# Patient Record
Sex: FEMALE | Race: WHITE | NOT HISPANIC OR LATINO
[De-identification: names, ages, dates, MRNs, and addresses within clinical notes are randomized per-mention and may not be internally consistent; named-entity substitution may affect disease eponyms.]

---

## 2017-01-31 ENCOUNTER — ASOB RESULT (OUTPATIENT)
Age: 27
End: 2017-01-31

## 2017-01-31 ENCOUNTER — APPOINTMENT (OUTPATIENT)
Dept: ANTEPARTUM | Facility: CLINIC | Age: 27
End: 2017-01-31

## 2017-02-01 ENCOUNTER — APPOINTMENT (OUTPATIENT)
Dept: ANTEPARTUM | Facility: CLINIC | Age: 27
End: 2017-02-01

## 2017-02-15 ENCOUNTER — INPATIENT (INPATIENT)
Facility: HOSPITAL | Age: 27
LOS: 1 days | Discharge: HOME | End: 2017-02-17
Attending: OBSTETRICS & GYNECOLOGY | Admitting: OBSTETRICS & GYNECOLOGY
Payer: COMMERCIAL

## 2017-02-15 VITALS — HEIGHT: 64 IN | WEIGHT: 167.55 LBS

## 2017-02-15 DIAGNOSIS — O47.1 FALSE LABOR AT OR AFTER 37 COMPLETED WEEKS OF GESTATION: ICD-10-CM

## 2017-02-15 DIAGNOSIS — Z34.80 ENCOUNTER FOR SUPERVISION OF OTHER NORMAL PREGNANCY, UNSPECIFIED TRIMESTER: ICD-10-CM

## 2017-02-15 LAB
BASOPHILS # BLD AUTO: 0 K/UL — SIGNIFICANT CHANGE UP (ref 0–0.2)
BASOPHILS NFR BLD AUTO: 0.3 % — SIGNIFICANT CHANGE UP (ref 0–2)
BLD GP AB SCN SERPL QL: NEGATIVE — SIGNIFICANT CHANGE UP
EOSINOPHIL # BLD AUTO: 0.1 K/UL — SIGNIFICANT CHANGE UP (ref 0–0.5)
EOSINOPHIL NFR BLD AUTO: 0.8 % — SIGNIFICANT CHANGE UP (ref 0–6)
HCT VFR BLD CALC: 35.4 % — SIGNIFICANT CHANGE UP (ref 34.5–45)
HGB BLD-MCNC: 12.2 G/DL — SIGNIFICANT CHANGE UP (ref 11.5–15.5)
LYMPHOCYTES # BLD AUTO: 1.8 K/UL — SIGNIFICANT CHANGE UP (ref 1–3.3)
LYMPHOCYTES # BLD AUTO: 13.5 % — SIGNIFICANT CHANGE UP (ref 13–44)
MCHC RBC-ENTMCNC: 26.4 PG — LOW (ref 27–34)
MCHC RBC-ENTMCNC: 34.4 GM/DL — SIGNIFICANT CHANGE UP (ref 32–36)
MCV RBC AUTO: 76.7 FL — LOW (ref 80–100)
MONOCYTES # BLD AUTO: 0.6 K/UL — SIGNIFICANT CHANGE UP (ref 0–0.9)
MONOCYTES NFR BLD AUTO: 4.2 % — SIGNIFICANT CHANGE UP (ref 2–14)
NEUTROPHILS # BLD AUTO: 11 K/UL — HIGH (ref 1.8–7.4)
NEUTROPHILS NFR BLD AUTO: 81.2 % — HIGH (ref 43–77)
PLATELET # BLD AUTO: 159 K/UL — SIGNIFICANT CHANGE UP (ref 150–400)
RBC # BLD: 4.61 M/UL — SIGNIFICANT CHANGE UP (ref 3.8–5.2)
RBC # FLD: 14 % — SIGNIFICANT CHANGE UP (ref 10.3–14.5)
RH IG SCN BLD-IMP: POSITIVE — SIGNIFICANT CHANGE UP
T PALLIDUM AB TITR SER: NEGATIVE — SIGNIFICANT CHANGE UP
WBC # BLD: 13.5 K/UL — HIGH (ref 3.8–10.5)
WBC # FLD AUTO: 13.5 K/UL — HIGH (ref 3.8–10.5)

## 2017-02-15 RX ORDER — MAGNESIUM HYDROXIDE 400 MG/1
30 TABLET, CHEWABLE ORAL
Qty: 0 | Refills: 0 | Status: DISCONTINUED | OUTPATIENT
Start: 2017-02-15 | End: 2017-02-17

## 2017-02-15 RX ORDER — PRAMOXINE HYDROCHLORIDE 150 MG/15G
1 AEROSOL, FOAM RECTAL EVERY 4 HOURS
Qty: 0 | Refills: 0 | Status: DISCONTINUED | OUTPATIENT
Start: 2017-02-15 | End: 2017-02-17

## 2017-02-15 RX ORDER — PENICILLIN G POTASSIUM 5000000 [IU]/1
POWDER, FOR SOLUTION INTRAMUSCULAR; INTRAPLEURAL; INTRATHECAL; INTRAVENOUS
Qty: 0 | Refills: 0 | Status: DISCONTINUED | OUTPATIENT
Start: 2017-02-15 | End: 2017-02-15

## 2017-02-15 RX ORDER — OXYTOCIN 10 UNIT/ML
41.67 VIAL (ML) INJECTION
Qty: 20 | Refills: 0 | Status: DISCONTINUED | OUTPATIENT
Start: 2017-02-15 | End: 2017-02-15

## 2017-02-15 RX ORDER — HYDROCORTISONE 1 %
1 OINTMENT (GRAM) TOPICAL EVERY 4 HOURS
Qty: 0 | Refills: 0 | Status: DISCONTINUED | OUTPATIENT
Start: 2017-02-15 | End: 2017-02-17

## 2017-02-15 RX ORDER — OXYTOCIN 10 UNIT/ML
333.33 VIAL (ML) INJECTION
Qty: 20 | Refills: 0 | Status: DISCONTINUED | OUTPATIENT
Start: 2017-02-15 | End: 2017-02-15

## 2017-02-15 RX ORDER — OXYCODONE HYDROCHLORIDE 5 MG/1
5 TABLET ORAL EVERY 4 HOURS
Qty: 0 | Refills: 0 | Status: DISCONTINUED | OUTPATIENT
Start: 2017-02-15 | End: 2017-02-17

## 2017-02-15 RX ORDER — SIMETHICONE 80 MG/1
80 TABLET, CHEWABLE ORAL EVERY 6 HOURS
Qty: 0 | Refills: 0 | Status: DISCONTINUED | OUTPATIENT
Start: 2017-02-15 | End: 2017-02-17

## 2017-02-15 RX ORDER — SODIUM CHLORIDE 9 MG/ML
1000 INJECTION, SOLUTION INTRAVENOUS
Qty: 0 | Refills: 0 | Status: DISCONTINUED | OUTPATIENT
Start: 2017-02-15 | End: 2017-02-15

## 2017-02-15 RX ORDER — PENICILLIN G POTASSIUM 5000000 [IU]/1
2.5 POWDER, FOR SOLUTION INTRAMUSCULAR; INTRAPLEURAL; INTRATHECAL; INTRAVENOUS EVERY 4 HOURS
Qty: 0 | Refills: 0 | Status: DISCONTINUED | OUTPATIENT
Start: 2017-02-15 | End: 2017-02-15

## 2017-02-15 RX ORDER — KETOROLAC TROMETHAMINE 30 MG/ML
30 SYRINGE (ML) INJECTION ONCE
Qty: 0 | Refills: 0 | Status: DISCONTINUED | OUTPATIENT
Start: 2017-02-15 | End: 2017-02-17

## 2017-02-15 RX ORDER — OXYTOCIN 10 UNIT/ML
333.33 VIAL (ML) INJECTION
Qty: 20 | Refills: 0 | Status: COMPLETED | OUTPATIENT
Start: 2017-02-15

## 2017-02-15 RX ORDER — KETOROLAC TROMETHAMINE 30 MG/ML
30 SYRINGE (ML) INJECTION ONCE
Qty: 0 | Refills: 0 | Status: DISCONTINUED | OUTPATIENT
Start: 2017-02-15 | End: 2017-02-15

## 2017-02-15 RX ORDER — IBUPROFEN 200 MG
600 TABLET ORAL EVERY 6 HOURS
Qty: 0 | Refills: 0 | Status: DISCONTINUED | OUTPATIENT
Start: 2017-02-15 | End: 2017-02-17

## 2017-02-15 RX ORDER — LANOLIN
1 OINTMENT (GRAM) TOPICAL EVERY 6 HOURS
Qty: 0 | Refills: 0 | Status: DISCONTINUED | OUTPATIENT
Start: 2017-02-15 | End: 2017-02-17

## 2017-02-15 RX ORDER — SODIUM CHLORIDE 9 MG/ML
3 INJECTION INTRAMUSCULAR; INTRAVENOUS; SUBCUTANEOUS EVERY 8 HOURS
Qty: 0 | Refills: 0 | Status: DISCONTINUED | OUTPATIENT
Start: 2017-02-15 | End: 2017-02-17

## 2017-02-15 RX ORDER — DIBUCAINE 1 %
1 OINTMENT (GRAM) RECTAL EVERY 4 HOURS
Qty: 0 | Refills: 0 | Status: DISCONTINUED | OUTPATIENT
Start: 2017-02-15 | End: 2017-02-17

## 2017-02-15 RX ORDER — PENICILLIN G POTASSIUM 5000000 [IU]/1
5 POWDER, FOR SOLUTION INTRAMUSCULAR; INTRAPLEURAL; INTRATHECAL; INTRAVENOUS ONCE
Qty: 0 | Refills: 0 | Status: COMPLETED | OUTPATIENT
Start: 2017-02-15 | End: 2017-02-15

## 2017-02-15 RX ORDER — HYDROCORTISONE 1 %
1 OINTMENT (GRAM) TOPICAL EVERY 4 HOURS
Qty: 0 | Refills: 0 | Status: DISCONTINUED | OUTPATIENT
Start: 2017-02-15 | End: 2017-02-15

## 2017-02-15 RX ORDER — AER TRAVELER 0.5 G/1
1 SOLUTION RECTAL; TOPICAL EVERY 4 HOURS
Qty: 0 | Refills: 0 | Status: DISCONTINUED | OUTPATIENT
Start: 2017-02-15 | End: 2017-02-17

## 2017-02-15 RX ORDER — PRAMOXINE HYDROCHLORIDE 150 MG/15G
1 AEROSOL, FOAM RECTAL EVERY 4 HOURS
Qty: 0 | Refills: 0 | Status: DISCONTINUED | OUTPATIENT
Start: 2017-02-15 | End: 2017-02-15

## 2017-02-15 RX ORDER — CITRIC ACID/SODIUM CITRATE 300-500 MG
15 SOLUTION, ORAL ORAL EVERY 4 HOURS
Qty: 0 | Refills: 0 | Status: DISCONTINUED | OUTPATIENT
Start: 2017-02-15 | End: 2017-02-15

## 2017-02-15 RX ORDER — TETANUS TOXOID, REDUCED DIPHTHERIA TOXOID AND ACELLULAR PERTUSSIS VACCINE, ADSORBED 5; 2.5; 8; 8; 2.5 [IU]/.5ML; [IU]/.5ML; UG/.5ML; UG/.5ML; UG/.5ML
0.5 SUSPENSION INTRAMUSCULAR ONCE
Qty: 0 | Refills: 0 | Status: DISCONTINUED | OUTPATIENT
Start: 2017-02-15 | End: 2017-02-17

## 2017-02-15 RX ORDER — DOCUSATE SODIUM 100 MG
100 CAPSULE ORAL
Qty: 0 | Refills: 0 | Status: DISCONTINUED | OUTPATIENT
Start: 2017-02-15 | End: 2017-02-16

## 2017-02-15 RX ORDER — AER TRAVELER 0.5 G/1
1 SOLUTION RECTAL; TOPICAL EVERY 4 HOURS
Qty: 0 | Refills: 0 | Status: DISCONTINUED | OUTPATIENT
Start: 2017-02-15 | End: 2017-02-15

## 2017-02-15 RX ORDER — SODIUM CHLORIDE 9 MG/ML
1000 INJECTION, SOLUTION INTRAVENOUS ONCE
Qty: 0 | Refills: 0 | Status: DISCONTINUED | OUTPATIENT
Start: 2017-02-15 | End: 2017-02-15

## 2017-02-15 RX ORDER — OXYTOCIN 10 UNIT/ML
41.67 VIAL (ML) INJECTION
Qty: 20 | Refills: 0 | Status: DISCONTINUED | OUTPATIENT
Start: 2017-02-15 | End: 2017-02-17

## 2017-02-15 RX ORDER — OXYCODONE HYDROCHLORIDE 5 MG/1
5 TABLET ORAL
Qty: 0 | Refills: 0 | Status: DISCONTINUED | OUTPATIENT
Start: 2017-02-15 | End: 2017-02-17

## 2017-02-15 RX ORDER — DIPHENHYDRAMINE HCL 50 MG
25 CAPSULE ORAL EVERY 6 HOURS
Qty: 0 | Refills: 0 | Status: DISCONTINUED | OUTPATIENT
Start: 2017-02-15 | End: 2017-02-17

## 2017-02-15 RX ORDER — DIBUCAINE 1 %
1 OINTMENT (GRAM) RECTAL EVERY 4 HOURS
Qty: 0 | Refills: 0 | Status: DISCONTINUED | OUTPATIENT
Start: 2017-02-15 | End: 2017-02-15

## 2017-02-15 RX ORDER — SODIUM CHLORIDE 9 MG/ML
3 INJECTION INTRAMUSCULAR; INTRAVENOUS; SUBCUTANEOUS EVERY 8 HOURS
Qty: 0 | Refills: 0 | Status: DISCONTINUED | OUTPATIENT
Start: 2017-02-15 | End: 2017-02-15

## 2017-02-15 RX ORDER — ACETAMINOPHEN 500 MG
975 TABLET ORAL EVERY 6 HOURS
Qty: 0 | Refills: 0 | Status: DISCONTINUED | OUTPATIENT
Start: 2017-02-15 | End: 2017-02-17

## 2017-02-15 RX ORDER — GLYCERIN ADULT
1 SUPPOSITORY, RECTAL RECTAL AT BEDTIME
Qty: 0 | Refills: 0 | Status: DISCONTINUED | OUTPATIENT
Start: 2017-02-15 | End: 2017-02-17

## 2017-02-15 RX ADMIN — Medication 975 MILLIGRAM(S): at 21:20

## 2017-02-15 RX ADMIN — OXYCODONE HYDROCHLORIDE 5 MILLIGRAM(S): 5 TABLET ORAL at 18:30

## 2017-02-15 RX ADMIN — Medication 600 MILLIGRAM(S): at 21:20

## 2017-02-15 RX ADMIN — Medication 975 MILLIGRAM(S): at 15:15

## 2017-02-15 RX ADMIN — Medication 1000 MILLIUNIT(S)/MIN: at 09:17

## 2017-02-15 RX ADMIN — Medication 975 MILLIGRAM(S): at 20:47

## 2017-02-15 RX ADMIN — Medication 600 MILLIGRAM(S): at 15:15

## 2017-02-15 RX ADMIN — Medication 1 TABLET(S): at 12:45

## 2017-02-15 RX ADMIN — SODIUM CHLORIDE 3 MILLILITER(S): 9 INJECTION INTRAMUSCULAR; INTRAVENOUS; SUBCUTANEOUS at 16:01

## 2017-02-15 RX ADMIN — Medication 30 MILLIGRAM(S): at 09:13

## 2017-02-15 RX ADMIN — Medication 600 MILLIGRAM(S): at 14:24

## 2017-02-15 RX ADMIN — Medication 975 MILLIGRAM(S): at 14:25

## 2017-02-15 RX ADMIN — PENICILLIN G POTASSIUM 200 MILLION UNIT(S): 5000000 POWDER, FOR SOLUTION INTRAMUSCULAR; INTRAPLEURAL; INTRATHECAL; INTRAVENOUS at 05:54

## 2017-02-15 RX ADMIN — OXYCODONE HYDROCHLORIDE 5 MILLIGRAM(S): 5 TABLET ORAL at 17:21

## 2017-02-15 RX ADMIN — Medication 600 MILLIGRAM(S): at 20:47

## 2017-02-16 ENCOUNTER — APPOINTMENT (OUTPATIENT)
Dept: ANTEPARTUM | Facility: CLINIC | Age: 27
End: 2017-02-16

## 2017-02-16 ENCOUNTER — TRANSCRIPTION ENCOUNTER (OUTPATIENT)
Age: 27
End: 2017-02-16

## 2017-02-16 LAB
HCT VFR BLD CALC: 28.1 % — LOW (ref 34.5–45)
HGB BLD-MCNC: 9.6 G/DL — LOW (ref 11.5–15.5)

## 2017-02-16 RX ORDER — DOCUSATE SODIUM 100 MG
100 CAPSULE ORAL
Qty: 0 | Refills: 0 | Status: DISCONTINUED | OUTPATIENT
Start: 2017-02-16 | End: 2017-02-17

## 2017-02-16 RX ORDER — FERROUS SULFATE 325(65) MG
325 TABLET ORAL
Qty: 0 | Refills: 0 | Status: DISCONTINUED | OUTPATIENT
Start: 2017-02-16 | End: 2017-02-17

## 2017-02-16 RX ORDER — ASCORBIC ACID 60 MG
250 TABLET,CHEWABLE ORAL
Qty: 0 | Refills: 0 | Status: DISCONTINUED | OUTPATIENT
Start: 2017-02-16 | End: 2017-02-17

## 2017-02-16 RX ADMIN — Medication 975 MILLIGRAM(S): at 23:16

## 2017-02-16 RX ADMIN — Medication 600 MILLIGRAM(S): at 16:20

## 2017-02-16 RX ADMIN — Medication 600 MILLIGRAM(S): at 20:25

## 2017-02-16 RX ADMIN — Medication 600 MILLIGRAM(S): at 15:45

## 2017-02-16 RX ADMIN — Medication 250 MILLIGRAM(S): at 08:56

## 2017-02-16 RX ADMIN — Medication 975 MILLIGRAM(S): at 23:51

## 2017-02-16 RX ADMIN — Medication 975 MILLIGRAM(S): at 18:44

## 2017-02-16 RX ADMIN — Medication 600 MILLIGRAM(S): at 03:10

## 2017-02-16 RX ADMIN — Medication 325 MILLIGRAM(S): at 08:56

## 2017-02-16 RX ADMIN — Medication 975 MILLIGRAM(S): at 04:00

## 2017-02-16 RX ADMIN — Medication 600 MILLIGRAM(S): at 21:07

## 2017-02-16 RX ADMIN — Medication 100 MILLIGRAM(S): at 08:57

## 2017-02-16 RX ADMIN — Medication 600 MILLIGRAM(S): at 08:57

## 2017-02-16 RX ADMIN — Medication 600 MILLIGRAM(S): at 09:35

## 2017-02-16 RX ADMIN — Medication 975 MILLIGRAM(S): at 03:07

## 2017-02-16 RX ADMIN — Medication 975 MILLIGRAM(S): at 08:57

## 2017-02-16 RX ADMIN — Medication 975 MILLIGRAM(S): at 09:35

## 2017-02-16 RX ADMIN — SODIUM CHLORIDE 3 MILLILITER(S): 9 INJECTION INTRAMUSCULAR; INTRAVENOUS; SUBCUTANEOUS at 00:21

## 2017-02-16 RX ADMIN — Medication 975 MILLIGRAM(S): at 18:00

## 2017-02-16 RX ADMIN — Medication 1 TABLET(S): at 11:48

## 2017-02-16 RX ADMIN — Medication 600 MILLIGRAM(S): at 04:00

## 2017-02-16 NOTE — DISCHARGE NOTE OB - MEDICATION SUMMARY - MEDICATIONS TO STOP TAKING
I will STOP taking the medications listed below when I get home from the hospital:    oxyCODONE 5 mg oral tablet  -- 1 tab(s) by mouth every 4 hours, As needed, Severe Pain

## 2017-02-16 NOTE — DISCHARGE NOTE OB - PATIENT PORTAL LINK FT
“You can access the FollowHealth Patient Portal, offered by [facility], by registering with the following website: [email or web address]”

## 2017-02-16 NOTE — DISCHARGE NOTE OB - MATERIALS PROVIDED
[facility] Hearing Screen Program/[facility] [place] Screening Program/Shaken Baby Prevention Handout/Back To Sleep Handout/Guide to Postpartum Care/Birth Certificate Instructions/Breastfeeding Guide and Packet/Vaccinations/Breastfeeding Log/[place]  Immunization Record

## 2017-02-16 NOTE — DISCHARGE NOTE OB - CARE PROVIDER_API CALL
Nicole Jimenez), Obstetrics and Gynecology  [address]  [place] [address]  Phone: [phone]  Fax: [phone]

## 2017-02-17 VITALS
SYSTOLIC BLOOD PRESSURE: 135 MMHG | TEMPERATURE: 99 F | DIASTOLIC BLOOD PRESSURE: 78 MMHG | OXYGEN SATURATION: 98 % | RESPIRATION RATE: 18 BRPM | HEART RATE: 88 BPM

## 2017-02-17 PROCEDURE — 86901 BLOOD TYPING SEROLOGIC RH(D): CPT

## 2017-02-17 PROCEDURE — 86780 TREPONEMA PALLIDUM: CPT

## 2017-02-17 PROCEDURE — G0463: CPT

## 2017-02-17 PROCEDURE — 85027 COMPLETE CBC AUTOMATED: CPT

## 2017-02-17 PROCEDURE — 86900 BLOOD TYPING SEROLOGIC ABO: CPT

## 2017-02-17 PROCEDURE — 59050 FETAL MONITOR W/REPORT: CPT

## 2017-02-17 PROCEDURE — 59025 FETAL NON-STRESS TEST: CPT

## 2017-02-17 PROCEDURE — 85018 HEMOGLOBIN: CPT

## 2017-02-17 PROCEDURE — 86850 RBC ANTIBODY SCREEN: CPT

## 2017-02-17 RX ADMIN — Medication 600 MILLIGRAM(S): at 13:20

## 2017-02-17 RX ADMIN — Medication 600 MILLIGRAM(S): at 05:08

## 2017-02-17 RX ADMIN — Medication 325 MILLIGRAM(S): at 09:53

## 2017-02-17 RX ADMIN — Medication 1 TABLET(S): at 12:50

## 2017-02-17 RX ADMIN — Medication 100 MILLIGRAM(S): at 05:09

## 2017-02-17 RX ADMIN — Medication 600 MILLIGRAM(S): at 12:50

## 2017-02-17 RX ADMIN — Medication 250 MILLIGRAM(S): at 09:53

## 2017-09-25 ENCOUNTER — RESULT REVIEW (OUTPATIENT)
Age: 27
End: 2017-09-25

## 2019-01-10 ENCOUNTER — RESULT REVIEW (OUTPATIENT)
Age: 29
End: 2019-01-10

## 2020-01-29 ENCOUNTER — ASOB RESULT (OUTPATIENT)
Age: 30
End: 2020-01-29

## 2020-01-29 ENCOUNTER — APPOINTMENT (OUTPATIENT)
Dept: ANTEPARTUM | Facility: CLINIC | Age: 30
End: 2020-01-29
Payer: COMMERCIAL

## 2020-01-29 PROCEDURE — 76816 OB US FOLLOW-UP PER FETUS: CPT

## 2020-02-16 ENCOUNTER — INPATIENT (INPATIENT)
Facility: HOSPITAL | Age: 30
LOS: 1 days | Discharge: HOME | End: 2020-02-18
Attending: OBSTETRICS & GYNECOLOGY | Admitting: OBSTETRICS & GYNECOLOGY
Payer: COMMERCIAL

## 2020-02-16 VITALS — HEIGHT: 64 IN | WEIGHT: 176.37 LBS

## 2020-02-16 DIAGNOSIS — Z34.80 ENCOUNTER FOR SUPERVISION OF OTHER NORMAL PREGNANCY, UNSPECIFIED TRIMESTER: ICD-10-CM

## 2020-02-16 DIAGNOSIS — Z3A.00 WEEKS OF GESTATION OF PREGNANCY NOT SPECIFIED: ICD-10-CM

## 2020-02-16 DIAGNOSIS — O26.899 OTHER SPECIFIED PREGNANCY RELATED CONDITIONS, UNSPECIFIED TRIMESTER: ICD-10-CM

## 2020-02-16 LAB
BASOPHILS # BLD AUTO: 0.07 K/UL — SIGNIFICANT CHANGE UP (ref 0–0.2)
BASOPHILS NFR BLD AUTO: 0.6 % — SIGNIFICANT CHANGE UP (ref 0–2)
BLD GP AB SCN SERPL QL: NEGATIVE — SIGNIFICANT CHANGE UP
EOSINOPHIL # BLD AUTO: 0.31 K/UL — SIGNIFICANT CHANGE UP (ref 0–0.5)
EOSINOPHIL NFR BLD AUTO: 2.9 % — SIGNIFICANT CHANGE UP (ref 0–6)
HCT VFR BLD CALC: 37.1 % — SIGNIFICANT CHANGE UP (ref 34.5–45)
HGB BLD-MCNC: 12.8 G/DL — SIGNIFICANT CHANGE UP (ref 11.5–15.5)
IMM GRANULOCYTES NFR BLD AUTO: 1 % — SIGNIFICANT CHANGE UP (ref 0–1.5)
LYMPHOCYTES # BLD AUTO: 29 % — SIGNIFICANT CHANGE UP (ref 13–44)
LYMPHOCYTES # BLD AUTO: 3.12 K/UL — SIGNIFICANT CHANGE UP (ref 1–3.3)
MCHC RBC-ENTMCNC: 27.2 PG — SIGNIFICANT CHANGE UP (ref 27–34)
MCHC RBC-ENTMCNC: 34.5 GM/DL — SIGNIFICANT CHANGE UP (ref 32–36)
MCV RBC AUTO: 78.8 FL — LOW (ref 80–100)
MONOCYTES # BLD AUTO: 0.89 K/UL — SIGNIFICANT CHANGE UP (ref 0–0.9)
MONOCYTES NFR BLD AUTO: 8.3 % — SIGNIFICANT CHANGE UP (ref 2–14)
NEUTROPHILS # BLD AUTO: 6.27 K/UL — SIGNIFICANT CHANGE UP (ref 1.8–7.4)
NEUTROPHILS NFR BLD AUTO: 58.2 % — SIGNIFICANT CHANGE UP (ref 43–77)
NRBC # BLD: 0 /100 WBCS — SIGNIFICANT CHANGE UP (ref 0–0)
PLATELET # BLD AUTO: 192 K/UL — SIGNIFICANT CHANGE UP (ref 150–400)
RBC # BLD: 4.71 M/UL — SIGNIFICANT CHANGE UP (ref 3.8–5.2)
RBC # FLD: 13.5 % — SIGNIFICANT CHANGE UP (ref 10.3–14.5)
RH IG SCN BLD-IMP: POSITIVE — SIGNIFICANT CHANGE UP
WBC # BLD: 10.77 K/UL — HIGH (ref 3.8–10.5)
WBC # FLD AUTO: 10.77 K/UL — HIGH (ref 3.8–10.5)

## 2020-02-16 RX ORDER — MAGNESIUM HYDROXIDE 400 MG/1
30 TABLET, CHEWABLE ORAL
Refills: 0 | Status: DISCONTINUED | OUTPATIENT
Start: 2020-02-16 | End: 2020-02-18

## 2020-02-16 RX ORDER — BENZOCAINE 10 %
1 GEL (GRAM) MUCOUS MEMBRANE EVERY 6 HOURS
Refills: 0 | Status: DISCONTINUED | OUTPATIENT
Start: 2020-02-16 | End: 2020-02-18

## 2020-02-16 RX ORDER — ACETAMINOPHEN 500 MG
975 TABLET ORAL
Refills: 0 | Status: DISCONTINUED | OUTPATIENT
Start: 2020-02-16 | End: 2020-02-18

## 2020-02-16 RX ORDER — SODIUM CHLORIDE 9 MG/ML
3 INJECTION INTRAMUSCULAR; INTRAVENOUS; SUBCUTANEOUS EVERY 8 HOURS
Refills: 0 | Status: DISCONTINUED | OUTPATIENT
Start: 2020-02-16 | End: 2020-02-18

## 2020-02-16 RX ORDER — OXYCODONE HYDROCHLORIDE 5 MG/1
5 TABLET ORAL
Refills: 0 | Status: DISCONTINUED | OUTPATIENT
Start: 2020-02-16 | End: 2020-02-18

## 2020-02-16 RX ORDER — GLYCERIN ADULT
1 SUPPOSITORY, RECTAL RECTAL AT BEDTIME
Refills: 0 | Status: DISCONTINUED | OUTPATIENT
Start: 2020-02-16 | End: 2020-02-18

## 2020-02-16 RX ORDER — IBUPROFEN 200 MG
600 TABLET ORAL EVERY 6 HOURS
Refills: 0 | Status: DISCONTINUED | OUTPATIENT
Start: 2020-02-16 | End: 2020-02-18

## 2020-02-16 RX ORDER — AMPICILLIN TRIHYDRATE 250 MG
2 CAPSULE ORAL ONCE
Refills: 0 | Status: COMPLETED | OUTPATIENT
Start: 2020-02-16 | End: 2020-02-16

## 2020-02-16 RX ORDER — OXYTOCIN 10 UNIT/ML
333.33 VIAL (ML) INJECTION
Qty: 20 | Refills: 0 | Status: DISCONTINUED | OUTPATIENT
Start: 2020-02-16 | End: 2020-02-18

## 2020-02-16 RX ORDER — FAMOTIDINE 10 MG/ML
20 INJECTION INTRAVENOUS ONCE
Refills: 0 | Status: COMPLETED | OUTPATIENT
Start: 2020-02-16 | End: 2020-02-16

## 2020-02-16 RX ORDER — SODIUM CHLORIDE 9 MG/ML
1000 INJECTION, SOLUTION INTRAVENOUS
Refills: 0 | Status: DISCONTINUED | OUTPATIENT
Start: 2020-02-16 | End: 2020-02-16

## 2020-02-16 RX ORDER — DIBUCAINE 1 %
1 OINTMENT (GRAM) RECTAL EVERY 6 HOURS
Refills: 0 | Status: DISCONTINUED | OUTPATIENT
Start: 2020-02-16 | End: 2020-02-18

## 2020-02-16 RX ORDER — HYDROCORTISONE 1 %
1 OINTMENT (GRAM) TOPICAL EVERY 6 HOURS
Refills: 0 | Status: DISCONTINUED | OUTPATIENT
Start: 2020-02-16 | End: 2020-02-18

## 2020-02-16 RX ORDER — LANOLIN
1 OINTMENT (GRAM) TOPICAL EVERY 6 HOURS
Refills: 0 | Status: DISCONTINUED | OUTPATIENT
Start: 2020-02-16 | End: 2020-02-18

## 2020-02-16 RX ORDER — OXYTOCIN 10 UNIT/ML
333.33 VIAL (ML) INJECTION
Qty: 20 | Refills: 0 | Status: DISCONTINUED | OUTPATIENT
Start: 2020-02-16 | End: 2020-02-16

## 2020-02-16 RX ORDER — SIMETHICONE 80 MG/1
80 TABLET, CHEWABLE ORAL EVERY 4 HOURS
Refills: 0 | Status: DISCONTINUED | OUTPATIENT
Start: 2020-02-16 | End: 2020-02-18

## 2020-02-16 RX ORDER — KETOROLAC TROMETHAMINE 30 MG/ML
30 SYRINGE (ML) INJECTION ONCE
Refills: 0 | Status: DISCONTINUED | OUTPATIENT
Start: 2020-02-16 | End: 2020-02-16

## 2020-02-16 RX ORDER — CITRIC ACID/SODIUM CITRATE 300-500 MG
15 SOLUTION, ORAL ORAL EVERY 6 HOURS
Refills: 0 | Status: DISCONTINUED | OUTPATIENT
Start: 2020-02-16 | End: 2020-02-16

## 2020-02-16 RX ORDER — DIPHENHYDRAMINE HCL 50 MG
25 CAPSULE ORAL EVERY 6 HOURS
Refills: 0 | Status: DISCONTINUED | OUTPATIENT
Start: 2020-02-16 | End: 2020-02-18

## 2020-02-16 RX ORDER — IBUPROFEN 200 MG
600 TABLET ORAL EVERY 6 HOURS
Refills: 0 | Status: COMPLETED | OUTPATIENT
Start: 2020-02-16 | End: 2021-01-14

## 2020-02-16 RX ORDER — PRAMOXINE HYDROCHLORIDE 150 MG/15G
1 AEROSOL, FOAM RECTAL EVERY 4 HOURS
Refills: 0 | Status: DISCONTINUED | OUTPATIENT
Start: 2020-02-16 | End: 2020-02-18

## 2020-02-16 RX ORDER — AER TRAVELER 0.5 G/1
1 SOLUTION RECTAL; TOPICAL EVERY 4 HOURS
Refills: 0 | Status: DISCONTINUED | OUTPATIENT
Start: 2020-02-16 | End: 2020-02-18

## 2020-02-16 RX ORDER — TETANUS TOXOID, REDUCED DIPHTHERIA TOXOID AND ACELLULAR PERTUSSIS VACCINE, ADSORBED 5; 2.5; 8; 8; 2.5 [IU]/.5ML; [IU]/.5ML; UG/.5ML; UG/.5ML; UG/.5ML
0.5 SUSPENSION INTRAMUSCULAR ONCE
Refills: 0 | Status: DISCONTINUED | OUTPATIENT
Start: 2020-02-16 | End: 2020-02-18

## 2020-02-16 RX ORDER — OXYCODONE HYDROCHLORIDE 5 MG/1
5 TABLET ORAL ONCE
Refills: 0 | Status: DISCONTINUED | OUTPATIENT
Start: 2020-02-16 | End: 2020-02-18

## 2020-02-16 RX ORDER — AMPICILLIN TRIHYDRATE 250 MG
1 CAPSULE ORAL EVERY 4 HOURS
Refills: 0 | Status: DISCONTINUED | OUTPATIENT
Start: 2020-02-16 | End: 2020-02-16

## 2020-02-16 RX ADMIN — FAMOTIDINE 20 MILLIGRAM(S): 10 INJECTION INTRAVENOUS at 05:14

## 2020-02-16 RX ADMIN — SODIUM CHLORIDE 125 MILLILITER(S): 9 INJECTION, SOLUTION INTRAVENOUS at 04:43

## 2020-02-16 RX ADMIN — Medication 975 MILLIGRAM(S): at 08:05

## 2020-02-16 RX ADMIN — Medication 975 MILLIGRAM(S): at 21:35

## 2020-02-16 RX ADMIN — Medication 975 MILLIGRAM(S): at 15:57

## 2020-02-16 RX ADMIN — Medication 600 MILLIGRAM(S): at 12:31

## 2020-02-16 RX ADMIN — Medication 30 MILLIGRAM(S): at 06:01

## 2020-02-16 RX ADMIN — Medication 216 GRAM(S): at 03:35

## 2020-02-16 RX ADMIN — Medication 975 MILLIGRAM(S): at 21:02

## 2020-02-16 RX ADMIN — Medication 1 TABLET(S): at 12:32

## 2020-02-16 RX ADMIN — Medication 600 MILLIGRAM(S): at 13:01

## 2020-02-16 RX ADMIN — Medication 1000 MILLIUNIT(S)/MIN: at 06:09

## 2020-02-16 RX ADMIN — Medication 600 MILLIGRAM(S): at 19:00

## 2020-02-16 RX ADMIN — Medication 975 MILLIGRAM(S): at 15:27

## 2020-02-16 RX ADMIN — Medication 600 MILLIGRAM(S): at 18:30

## 2020-02-16 RX ADMIN — Medication 15 MILLILITER(S): at 04:43

## 2020-02-17 LAB
HCT VFR BLD CALC: 35.6 % — SIGNIFICANT CHANGE UP (ref 34.5–45)
HGB BLD-MCNC: 11.6 G/DL — SIGNIFICANT CHANGE UP (ref 11.5–15.5)
T PALLIDUM AB TITR SER: NEGATIVE — SIGNIFICANT CHANGE UP

## 2020-02-17 RX ADMIN — Medication 975 MILLIGRAM(S): at 22:05

## 2020-02-17 RX ADMIN — Medication 975 MILLIGRAM(S): at 17:48

## 2020-02-17 RX ADMIN — Medication 600 MILLIGRAM(S): at 17:48

## 2020-02-17 RX ADMIN — Medication 600 MILLIGRAM(S): at 00:34

## 2020-02-17 RX ADMIN — Medication 600 MILLIGRAM(S): at 00:01

## 2020-02-17 RX ADMIN — Medication 1 TABLET(S): at 12:04

## 2020-02-17 RX ADMIN — Medication 600 MILLIGRAM(S): at 23:10

## 2020-02-17 RX ADMIN — Medication 975 MILLIGRAM(S): at 15:40

## 2020-02-17 RX ADMIN — Medication 975 MILLIGRAM(S): at 21:04

## 2020-02-17 NOTE — PROGRESS NOTE ADULT - ASSESSMENT
Per Dr. Jimenez-  Abdomen: Soft, nontender, non-distended, fundus firm.  Vaginal: Lochia WNL.   Ext: Neg edema, Neg calf tenderness

## 2020-02-18 ENCOUNTER — APPOINTMENT (OUTPATIENT)
Dept: ANTEPARTUM | Facility: CLINIC | Age: 30
End: 2020-02-18

## 2020-02-18 ENCOUNTER — TRANSCRIPTION ENCOUNTER (OUTPATIENT)
Age: 30
End: 2020-02-18

## 2020-02-18 VITALS
DIASTOLIC BLOOD PRESSURE: 77 MMHG | RESPIRATION RATE: 18 BRPM | OXYGEN SATURATION: 97 % | TEMPERATURE: 98 F | HEART RATE: 83 BPM | SYSTOLIC BLOOD PRESSURE: 123 MMHG

## 2020-02-18 PROCEDURE — 85014 HEMATOCRIT: CPT

## 2020-02-18 PROCEDURE — 59025 FETAL NON-STRESS TEST: CPT

## 2020-02-18 PROCEDURE — 86901 BLOOD TYPING SEROLOGIC RH(D): CPT

## 2020-02-18 PROCEDURE — 86780 TREPONEMA PALLIDUM: CPT

## 2020-02-18 PROCEDURE — 85027 COMPLETE CBC AUTOMATED: CPT

## 2020-02-18 PROCEDURE — 59050 FETAL MONITOR W/REPORT: CPT

## 2020-02-18 PROCEDURE — 85018 HEMOGLOBIN: CPT

## 2020-02-18 PROCEDURE — 86850 RBC ANTIBODY SCREEN: CPT

## 2020-02-18 PROCEDURE — 86900 BLOOD TYPING SEROLOGIC ABO: CPT

## 2020-02-18 PROCEDURE — G0463: CPT

## 2020-02-18 RX ORDER — ACETAMINOPHEN 500 MG
3 TABLET ORAL
Qty: 0 | Refills: 0 | DISCHARGE
Start: 2020-02-18

## 2020-02-18 RX ORDER — IBUPROFEN 200 MG
1 TABLET ORAL
Qty: 0 | Refills: 0 | DISCHARGE
Start: 2020-02-18

## 2020-02-18 RX ADMIN — Medication 600 MILLIGRAM(S): at 09:00

## 2020-02-18 RX ADMIN — Medication 600 MILLIGRAM(S): at 08:08

## 2020-02-18 RX ADMIN — Medication 600 MILLIGRAM(S): at 00:10

## 2020-02-18 NOTE — PROGRESS NOTE ADULT - SUBJECTIVE AND OBJECTIVE BOX
Postpartum Note- PPD#2      S:Patient w/o complaints, pain is controlled.    Pt is OOB, tolerating PO, voiding. Vaginal bleeding mild to moderate.       O:  Vital Signs Last 24 Hrs  T(C): 36.7 (18 Feb 2020 05:51), Max: 36.9 (17 Feb 2020 18:00)  T(F): 98.1 (18 Feb 2020 05:51), Max: 98.4 (17 Feb 2020 18:00)  HR: 83 (18 Feb 2020 05:51) (65 - 83)  BP: 123/77 (18 Feb 2020 05:51) (110/71 - 123/77)  BP(mean): --  RR: 18 (18 Feb 2020 05:51) (18 - 18)  SpO2: 97% (18 Feb 2020 05:51) (97% - 98%)         LABS:    Hemoglobin: 11.6 g/dL (02-17 @ 06:09)      Hematocrit: 35.6 % (02-17 @ 06:09)

## 2020-02-18 NOTE — DISCHARGE NOTE OB - CARE PLAN
Principal Discharge DX:	 (normal spontaneous vaginal delivery)  Goal:	recovery  Assessment and plan of treatment:	return to baseline health, regular diet, pain control, follow up with Dr Jimenez

## 2020-02-18 NOTE — DISCHARGE NOTE OB - CARE PROVIDER_API CALL
Nicloe Jimenez)  Obstetrics and Gynecology  [address]  [place] [address]  Phone: [phone]  Fax: [phone]  Follow Up Time:

## 2020-02-18 NOTE — PROGRESS NOTE ADULT - ASSESSMENT
Abdomen: Soft, nontender, non-distended, fundus firm.  Vaginal: Lochia WNL.   Ext: Neg edema, Neg calf tenderness

## 2020-02-18 NOTE — DISCHARGE NOTE OB - PATIENT PORTAL LINK FT
You can access the FollowMyHealth Patient Portal offered by [facility] by registering at the following website: [email or web address]. By joining [employer]’s FollowMyHealth portal, you will also be able to view your health information using other applications (apps) compatible with our system.

## 2020-11-19 ENCOUNTER — RESULT REVIEW (OUTPATIENT)
Age: 30
End: 2020-11-19

## 2021-06-11 NOTE — PATIENT PROFILE OB - PRO INTERPRETER NEED 2
Electrophysiology Clinic Note  HPI:   Mr. Summers is a 77 year old male who has a past medical history significant for BAV with aortic stenosis s/p AVR 7/2011, endocarditis of prosthetic valve s/p aortic subvalvular debridement, AVR with 21 mm Johnson Perimount magna valve and pericardial patch repair of a large perforation on the anterior mitral leaflet 2013, NICM LVEF 15-20% with later normalization of LVEF, LBBB, s/p CRTD 2012 s/p gen change 2015 and 3/10/2021, PAF (CHADSVASC 3 on warfarin), gout, thrombocytopenia, and prior tobacco use.     He had BAV that developed severe aortic stenosis. He underwent a AVR with 23 mm Bogdan Johnson Perimount Magna tissue valve in 2011. He had reduced LVEF 15-20% with LBBB and underwent a CRTD in 2012. His LVEF has subsequently normalized. He developed coagulase-negative staph endocarditis of prosthetic valve and had an extensive redo surgery on 1/25/2013 which  involved aortic subvalvular debridement, AVR with 21 mm Johnson Perimount magna valve and pericardial patch repair of a large perforation on the anterior mitral leaflet. His ICD system was not removed during this.He had some post operative AF. He had a CRTD generator change in 2015 and then again in 3/2021.     He is following up today after his CRTD generator change. Device site well healed. He reports feeling well. He denies any chest pain/pressures, dizziness, lightheadedness, worsening shortness of breath, leg/ankle swelling, PND, orthopnea, palpitations, or syncopal symptoms. Device interrogation shows normal device function, LV 2.5@ 0.4, intrinsic is CHB VS @ 44 bpm, and %. Lasts echo from 2/2020 showed LVEF 55-60%, mild RV dilation, normal tissue aortic valve function. Current cardiac medications include: Losartan, Toprol XL, and Warfarin.     PAST MEDICAL HISTORY:  Past Medical History:   Diagnosis Date     BCC (basal cell carcinoma), face 5/16/2013     Bicuspid aortic valve      Chronic systolic heart  failure (H)      Endocarditis of prosthetic valve (H) Jan 2013    Cultures negative, 16S rRNA PCR showed Staph capitis (a CoNS). Tx with Dapto/RIFx 8 weeks.     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 60 tablet 11     losartan (COZAAR) 25 MG tablet Take 0.5 tablets (12.5 mg) by mouth daily 45 tablet 3     metoprolol succinate ER (TOPROL-XL) 50 MG 24 hr tablet Take 1 tablet (50 mg) by mouth daily 90 tablet 3     Multiple Vitamins-Minerals (MULTIVITAMIN ADULTS 50+) TABS        oxyCODONE-acetaminophen (PERCOCET) 5-325 MG tablet Take 1 tablet by mouth every 6 hours as needed for severe pain (Patient not taking: Reported on 6/1/2021) 12 tablet 0     oxyCODONE-acetaminophen (PERCOCET) 5-325 MG tablet Take 1 tablet by mouth every 6 hours as needed for severe pain (Patient not taking: Reported on 6/1/2021) 12 tablet 0     PROVIDER DOSED MANAGED WARFARIN, COUMADIN, OUTPATIENT Per coumadin clinic       simvastatin (ZOCOR) 40 MG tablet Take 1 tablet (40 mg) by mouth At Bedtime 90 tablet 0     VITAMIN D, CHOLECALCIFEROL, PO Take 1,000 Units by mouth daily       warfarin ANTICOAGULANT (COUMADIN) 5 MG tablet Take 10mg Fri; 7.5mg all other days or as directed by the Anticoagulation Clinic 145 tablet 0       PAST SURGICAL HISTORY:  Past Surgical History:   Procedure Laterality Date     ANESTHESIA CARDIOVERSION   7/18/2011    Procedure:ANESTHESIA CARDIOVERSION; Cardioversion; Surgeon:GENERIC ANESTHESIA PROVIDER; Location:[facility] OR     COLONOSCOPY       COLONOSCOPY N/A 11/19/2018    Procedure: COLONOSCOPY;  Surgeon: Herman Mcginnis MD;  Location:  GI     CORONARY ANGIOGRAPHY ADULT ORDER       CYSTOSCOPY, BIOPSY URETHRA N/A 4/3/2017    Procedure: CYSTOSCOPY, BIOPSY URETHRA;  Surgeon: Marlena Mora MD;  Location:  OR     ENDOSCOPY UPPER, COLONOSCOPY, COMBINED       EP ICD GENERATOR CHANGE DUAL N/A 3/10/2021    Procedure: EP ICD GENERATOR CHANGE DUAL;  Surgeon: Mekhi Be MD;  Location:  HEART CARDIAC CATH LAB     HC REMOV & REPLACE IMPLT DEFIB PULSE GEN MULT LEAD  12/3/2015          HEMORRHOID SURGERY       IMPLANT AUTOMATIC IMPLANTABLE CARDIOVERTER DEFIBRILLATOR       MOHS MICROGRAPHIC PROCEDURE       ORTHOPEDIC SURGERY      shoulder,right     REDO STERNOTOMY REPLACE VALVE AORTIC  1/25/2013    Procedure: REDO STERNOTOMY REPLACE VALVE AORTIC;  Redo Sternotomy, Redo Aortic Valve Replacement on pump oxygenator. Intraoperative Transesophageal Echocardiogram;  Surgeon: Navin Hampton MD;  Location:  OR     REPAIR VALVE MITRAL  2013     REPLACE VALVE AORTIC  7/14/2011    Procedure:REPLACE VALVE AORTIC; Median Sternotomy, Bioprosthesis,  Aortic Valve replacement on pump with oxygenator. Intra-operative Transesophogeal Echocardiogram.; Surgeon:NAVIN HAMPTON; Location: OR     teeth extractions       TRANSPLANT         ALLERGIES:     Allergies   Allergen Reactions     Lisinopril Cough       FAMILY HISTORY:  Family History   Problem Relation Age of Onset     C.A.D. Father 68        bypass, carotid      Prostate Cancer Father 70     Heart Disease Father      Cancer Mother 92        stomach, colon     Hypertension Mother      Retinal detachment Mother      Cancer Brother 64        lung cancer, petroleum     Cancer Brother      Glaucoma No family hx of      Macular Degeneration No family hx of      Strabismus  "No family hx of      Glasses (<7 y/o) No family hx of        SOCIAL HISTORY:  Social History     Tobacco Use     Smoking status: Former Smoker     Packs/day: 1.00     Years: 20.00     Pack years: 20.00     Types: Cigarettes     Quit date: 1989     Years since quittin.4     Smokeless tobacco: Never Used   Substance Use Topics     Alcohol use: Yes     Alcohol/week: 2.0 standard drinks     Drug use: No       ROS:   A comprehensive 10 point review of systems negative other than as mentioned in HPI.  Exam:  /77 (BP Location: Left arm, Patient Position: Chair, Cuff Size: Adult Regular)   Pulse 68   Ht 1.795 m (5' 10.67\")   Wt 78 kg (172 lb)   SpO2 96%   BMI 24.21 kg/m    GENERAL APPEARANCE: alert and no distress  HEENT: MMM. PERRLA.  NECK: supple.   RESPIRATORY: lungs clear to auscultation - no rales, rhonchi or wheezes, no use of accessory muscles, no retractions, respirations are unlabored, normal respiratory rate  CARDIOVASCULAR: regular rhythm, S1/S2, no murmur.   ABDOMEN: soft, NT, ND, +BS.  EXTREMITIES: peripheral pulses normal, no edema  NEURO: alert and oriented to person/place/time, normal speech, gait and affect  SKIN: no ecchymoses, no rashes  PSYCH: normal affect, cooperative    Labs:  Reviewed.     Testing/Procedures:  2/10/20 ECHOCARDIOGRAM:   Interpretation Summary  Global and regional left ventricular function is normal with an EF of 55-60%.  Mild right ventricular dilation with normal global right ventricular function.  s/p 23 mm Bogdan Johnson Perimount Magna tissue valve in aortic position  (). Normal doppler assessment (mean gradient 9mmHg,  EOA is 1.8 cm^2, DVI 0.48). Mild eccentric AI.  The mean gradient is 9 mmHg.  Estimated pulmonary artery systolic pressure is 19 mmHg plus right atrial  pressure.  The inferior vena cava was normal in size with preserved respiratory  variability.     This study was compared with the study from 18. Minimal interval " change.      Assessment and Plan:   Mr. Summers is a 77 year old male who has a past medical history significant for BAV with aortic stenosis s/p AVR 7/2011, endocarditis of prosthetic valve s/p aortic subvalvular debridement, AVR with 21 mm Johnson Perimount magna valve and pericardial patch repair of a large perforation on the anterior mitral leaflet 2013, NICM LVEF 15-20% with later normalization of LVEF, LBBB, s/p CRTD 2012 s/p gen change 2015 and 3/10/2021, PAF (CHADSVASC 3 on warfarin), gout, thrombocytopenia, and prior tobacco use.He is following up today after his CRTD generator change. Device site well healed. He reports feeling well. He denies any chest pain/pressures, dizziness, lightheadedness, worsening shortness of breath, leg/ankle swelling, PND, orthopnea, palpitations, or syncopal symptoms. Device interrogation shows normal device function, LV 2.5@ 0.4, intrinsic is CHB VS @ 44 bpm, and %. Lasts echo from 2/2020 showed LVEF 55-60%, mild RV dilation, normal tissue aortic valve function. Current cardiac medications include: Losartan, Toprol XL, and Warfarin.     BAV s/p AVR 7/2011, endocarditis of prosthetic valve s/p aortic subvalvular debridement, AVR  and pericardial patch repair of a large perforation on the anterior mitral leaflet 2013  PAF  NICM LVEF 15-20% with normalization, NYHA II:  1. ACEi/ARB: Continue Losartan.  2. BB: Continue Toprol XL   3. Aldosterone antagonist: not currently required. .  4. SCD prophylaxis: s/p CRTD 2012 with gen change 2015 and 3/10/21. Good BVP. Device well healed with normal function.   5. Fluid status: Euvolemic on exam.   6. PAF- low burden and not symptomatic, continue Warfarin and Toprol XL. No further rhythm control measures at this time.     He will continue to follow with Dr. Mckenzie and device clinic per routine. He can follow up with EP on an as needed basis.     The patient states understanding and is agreeable with plan.   Kailee Mcdonald, APRN  CNP  Pager: [phone]  CC  ISABELLA JOHN         English

## 2022-01-23 NOTE — DISCHARGE NOTE OB - BREASTFEEDING PROVIDES STABLE TEMPERATURE THROUGH SKIN TO SKIN CONTACT
Message  left to call ER
The patient was called for notification of a POSITIVE test result for COVID-19. The following information was given to the patient:    The COVID-19 test result was positive  Mild and stable symptoms are managed at home    Treatment of coronavirus does not require an antibiotic  Remain isolated for 10 days since symptoms first appeared AND at least 3 days have passed after recovery    Recovery is defined as resolution of fever without the use of fever-reducing medications with progressive improvement or resolution of other symptoms    Wash hands often with soap and water for at least 20 seconds or alternatively use hand  with at least 60% alcohol content  Cover coughs and sneezes  Wear a mask when around others if possible  Clean all \"high-touch\" surfaces every day, such as doorknobs and cellphones  Continually monitor symptoms.  Contact your medical provider if symptoms are worsening, such as difficulty breathing  For more information visit the CDC website: DotProtection.gl
Statement Selected

## 2022-09-01 ENCOUNTER — RESULT REVIEW (OUTPATIENT)
Age: 32
End: 2022-09-01

## 2023-08-11 ENCOUNTER — APPOINTMENT (OUTPATIENT)
Dept: OBGYN | Facility: CLINIC | Age: 33
End: 2023-08-11
Payer: COMMERCIAL

## 2023-08-11 VITALS — SYSTOLIC BLOOD PRESSURE: 102 MMHG | DIASTOLIC BLOOD PRESSURE: 68 MMHG

## 2023-08-11 VITALS
HEART RATE: 112 BPM | SYSTOLIC BLOOD PRESSURE: 112 MMHG | OXYGEN SATURATION: 99 % | TEMPERATURE: 98.2 F | DIASTOLIC BLOOD PRESSURE: 66 MMHG

## 2023-08-11 DIAGNOSIS — Z78.9 OTHER SPECIFIED HEALTH STATUS: ICD-10-CM

## 2023-08-11 DIAGNOSIS — O02.1 MISSED ABORTION: ICD-10-CM

## 2023-08-11 PROCEDURE — 99202 OFFICE O/P NEW SF 15 MIN: CPT | Mod: 25

## 2023-08-11 PROCEDURE — 59820 CARE OF MISCARRIAGE: CPT

## 2023-08-11 RX ORDER — DOXYCYCLINE HYCLATE 100 MG/1
100 TABLET ORAL
Qty: 2 | Refills: 0 | Status: ACTIVE | COMMUNITY
Start: 2023-08-11 | End: 1900-01-01

## 2023-08-11 RX ORDER — IBUPROFEN 600 MG/1
600 TABLET, FILM COATED ORAL 4 TIMES DAILY
Qty: 60 | Refills: 0 | Status: ACTIVE | COMMUNITY
Start: 2023-08-11 | End: 1900-01-01
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
## 2023-08-11 NOTE — PHYSICAL EXAM
[Chaperone Present] : A chaperone was present in the examining room during all aspects of the physical examination [FreeTextEntry1] : Rhonda Isbell LPN [Appropriately responsive] : appropriately responsive [Alert] : alert [No Acute Distress] : no acute distress [Soft] : soft [Non-tender] : non-tender [Non-distended] : non-distended [Oriented x3] : oriented x3
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
## 2023-08-17 LAB — CORE LAB BIOPSY: NORMAL

## 2023-08-30 ENCOUNTER — APPOINTMENT (OUTPATIENT)
Dept: OBGYN | Facility: CLINIC | Age: 33
End: 2023-08-30
Payer: COMMERCIAL

## 2023-08-30 PROCEDURE — 99024 POSTOP FOLLOW-UP VISIT: CPT
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
## 2024-02-01 NOTE — DISCHARGE NOTE OB - NS OB DC IMMUNIZATIONS MMR YN
This patient has been assessed with a concern for Malnutrition and has been determined to have a diagnosis/diagnoses of Moderate protein-calorie malnutrition.    This patient is being managed with:   Diet Regular-  Supplement Feeding Modality:  Oral  Ensure Plus High Protein Cans or Servings Per Day:  1       Frequency:  Two Times a day  Entered: Jan 25 2024  2:22PM    Diet Regular-  Entered: Jan 18 2024 10:36PM    The following pending diet order is being considered for treatment of Moderate protein-calorie malnutrition:  Diet Easy to Chew-  Supplement Feeding Modality:  Oral  Ensure Plus High Protein Cans or Servings Per Day:  1       Frequency:  Two Times a day  Entered: Jan 25 2024  2:28PM   This patient has been assessed with a concern for Malnutrition and has been determined to have a diagnosis/diagnoses of Moderate protein-calorie malnutrition.    This patient is being managed with:   Diet Easy to Chew-  Supplement Feeding Modality:  Oral  Ensure Plus High Protein Cans or Servings Per Day:  1       Frequency:  Two Times a day  Entered: Jan 25 2024  2:28PM   This patient has been assessed with a concern for Malnutrition and has been determined to have a diagnosis/diagnoses of Moderate protein-calorie malnutrition.    This patient is being managed with:   Diet Easy to Chew-  Supplement Feeding Modality:  Oral  Ensure Plus High Protein Cans or Servings Per Day:  1       Frequency:  Two Times a day  Entered: Jan 25 2024  2:28PM   This patient has been assessed with a concern for Malnutrition and has been determined to have a diagnosis/diagnoses of Moderate protein-calorie malnutrition.    This patient is being managed with:   Diet Easy to Chew-  Supplement Feeding Modality:  Oral  Ensure Plus High Protein Cans or Servings Per Day:  1       Frequency:  Two Times a day  Entered: Jan 25 2024  2:28PM   This patient has been assessed with a concern for Malnutrition and has been determined to have a diagnosis/diagnoses of Moderate protein-calorie malnutrition.    This patient is being managed with:   Diet Easy to Chew-  Supplement Feeding Modality:  Oral  Ensure Plus High Protein Cans or Servings Per Day:  1       Frequency:  Two Times a day  Entered: Jan 25 2024  2:28PM   This patient has been assessed with a concern for Malnutrition and has been determined to have a diagnosis/diagnoses of Moderate protein-calorie malnutrition.    This patient is being managed with:   Diet Easy to Chew-  Supplement Feeding Modality:  Oral  Ensure Plus High Protein Cans or Servings Per Day:  1       Frequency:  Two Times a day  Entered: Jan 25 2024  2:28PM   This patient has been assessed with a concern for Malnutrition and has been determined to have a diagnosis/diagnoses of Moderate protein-calorie malnutrition.    This patient is being managed with:   Diet Easy to Chew-  Supplement Feeding Modality:  Oral  Ensure Plus High Protein Cans or Servings Per Day:  1       Frequency:  Two Times a day  Entered: Jan 25 2024  2:28PM   No/titers did not indicate a need to immunize

## 2024-04-19 ENCOUNTER — APPOINTMENT (OUTPATIENT)
Dept: ANTEPARTUM | Facility: CLINIC | Age: 34
End: 2024-04-19
Payer: COMMERCIAL

## 2024-04-19 ENCOUNTER — ASOB RESULT (OUTPATIENT)
Age: 34
End: 2024-04-19

## 2024-04-19 PROCEDURE — 76813 OB US NUCHAL MEAS 1 GEST: CPT

## 2024-04-19 PROCEDURE — 76801 OB US < 14 WKS SINGLE FETUS: CPT

## 2024-06-18 ENCOUNTER — APPOINTMENT (OUTPATIENT)
Dept: ANTEPARTUM | Facility: CLINIC | Age: 34
End: 2024-06-18

## 2024-06-18 ENCOUNTER — ASOB RESULT (OUTPATIENT)
Age: 34
End: 2024-06-18

## 2024-06-18 PROCEDURE — 76805 OB US >/= 14 WKS SNGL FETUS: CPT

## 2024-08-20 ENCOUNTER — ASOB RESULT (OUTPATIENT)
Age: 34
End: 2024-08-20

## 2024-08-20 ENCOUNTER — APPOINTMENT (OUTPATIENT)
Dept: ANTEPARTUM | Facility: CLINIC | Age: 34
End: 2024-08-20
Payer: COMMERCIAL

## 2024-08-20 PROCEDURE — 76816 OB US FOLLOW-UP PER FETUS: CPT

## 2024-08-20 PROCEDURE — 76819 FETAL BIOPHYS PROFIL W/O NST: CPT

## 2024-09-10 ENCOUNTER — APPOINTMENT (OUTPATIENT)
Dept: ANTEPARTUM | Facility: CLINIC | Age: 34
End: 2024-09-10

## 2024-10-23 ENCOUNTER — INPATIENT (INPATIENT)
Facility: HOSPITAL | Age: 34
LOS: 0 days | Discharge: HOME | End: 2024-10-24
Attending: OBSTETRICS & GYNECOLOGY | Admitting: OBSTETRICS & GYNECOLOGY
Payer: COMMERCIAL

## 2024-10-23 ENCOUNTER — TRANSCRIPTION ENCOUNTER (OUTPATIENT)
Age: 34
End: 2024-10-23

## 2024-10-23 VITALS — HEART RATE: 101 BPM | OXYGEN SATURATION: 99 %

## 2024-10-23 DIAGNOSIS — O26.899 OTHER SPECIFIED PREGNANCY RELATED CONDITIONS, UNSPECIFIED TRIMESTER: ICD-10-CM

## 2024-10-23 DIAGNOSIS — Z34.80 ENCOUNTER FOR SUPERVISION OF OTHER NORMAL PREGNANCY, UNSPECIFIED TRIMESTER: ICD-10-CM

## 2024-10-23 DIAGNOSIS — Z3A.38 38 WEEKS GESTATION OF PREGNANCY: ICD-10-CM

## 2024-10-23 LAB
BASOPHILS # BLD AUTO: 0.05 K/UL — SIGNIFICANT CHANGE UP (ref 0–0.2)
BASOPHILS NFR BLD AUTO: 0.5 % — SIGNIFICANT CHANGE UP (ref 0–2)
EOSINOPHIL # BLD AUTO: 0.2 K/UL — SIGNIFICANT CHANGE UP (ref 0–0.5)
EOSINOPHIL NFR BLD AUTO: 1.9 % — SIGNIFICANT CHANGE UP (ref 0–6)
HCT VFR BLD CALC: 33.7 % — LOW (ref 34.5–45)
HGB BLD-MCNC: 11.1 G/DL — LOW (ref 11.5–15.5)
IMM GRANULOCYTES NFR BLD AUTO: 1 % — HIGH (ref 0–0.9)
LYMPHOCYTES # BLD AUTO: 2.22 K/UL — SIGNIFICANT CHANGE UP (ref 1–3.3)
LYMPHOCYTES # BLD AUTO: 20.8 % — SIGNIFICANT CHANGE UP (ref 13–44)
MCHC RBC-ENTMCNC: 24.9 PG — LOW (ref 27–34)
MCHC RBC-ENTMCNC: 32.9 GM/DL — SIGNIFICANT CHANGE UP (ref 32–36)
MCV RBC AUTO: 75.6 FL — LOW (ref 80–100)
MONOCYTES # BLD AUTO: 0.63 K/UL — SIGNIFICANT CHANGE UP (ref 0–0.9)
MONOCYTES NFR BLD AUTO: 5.9 % — SIGNIFICANT CHANGE UP (ref 2–14)
NEUTROPHILS # BLD AUTO: 7.45 K/UL — HIGH (ref 1.8–7.4)
NEUTROPHILS NFR BLD AUTO: 69.9 % — SIGNIFICANT CHANGE UP (ref 43–77)
NRBC # BLD: 0 /100 WBCS — SIGNIFICANT CHANGE UP (ref 0–0)
PLATELET # BLD AUTO: 169 K/UL — SIGNIFICANT CHANGE UP (ref 150–400)
RBC # BLD: 4.46 M/UL — SIGNIFICANT CHANGE UP (ref 3.8–5.2)
RBC # FLD: 14.6 % — HIGH (ref 10.3–14.5)
T PALLIDUM AB TITR SER: NEGATIVE — SIGNIFICANT CHANGE UP
WBC # BLD: 10.66 K/UL — HIGH (ref 3.8–10.5)
WBC # FLD AUTO: 10.66 K/UL — HIGH (ref 3.8–10.5)

## 2024-10-23 RX ORDER — OXYTOCIN IN D5W-0.2% SODIUM CL 15/250 ML
167 PLASTIC BAG, INJECTION (ML) INTRAVENOUS
Qty: 30 | Refills: 0 | Status: DISCONTINUED | OUTPATIENT
Start: 2024-10-23 | End: 2024-10-24

## 2024-10-23 RX ORDER — IBUPROFEN 200 MG
600 TABLET ORAL EVERY 6 HOURS
Refills: 0 | Status: COMPLETED | OUTPATIENT
Start: 2024-10-23 | End: 2025-09-21

## 2024-10-23 RX ORDER — OXYCODONE HYDROCHLORIDE 30 MG/1
5 TABLET ORAL ONCE
Refills: 0 | Status: DISCONTINUED | OUTPATIENT
Start: 2024-10-23 | End: 2024-10-24

## 2024-10-23 RX ORDER — AMPICILLIN 2 G/1
1 INJECTION, POWDER, FOR SOLUTION INTRAVENOUS EVERY 4 HOURS
Refills: 0 | Status: DISCONTINUED | OUTPATIENT
Start: 2024-10-23 | End: 2024-10-23

## 2024-10-23 RX ORDER — AMPICILLIN 2 G/1
2 INJECTION, POWDER, FOR SOLUTION INTRAVENOUS ONCE
Refills: 0 | Status: DISCONTINUED | OUTPATIENT
Start: 2024-10-23 | End: 2024-10-23

## 2024-10-23 RX ORDER — DIBUCAINE 1 %
1 OINTMENT (GRAM) TOPICAL EVERY 6 HOURS
Refills: 0 | Status: DISCONTINUED | OUTPATIENT
Start: 2024-10-23 | End: 2024-10-24

## 2024-10-23 RX ORDER — ACETAMINOPHEN 500 MG
1000 TABLET ORAL ONCE
Refills: 0 | Status: DISCONTINUED | OUTPATIENT
Start: 2024-10-23 | End: 2024-10-24

## 2024-10-23 RX ORDER — HYDROCORTISONE 1 %
1 OINTMENT (GRAM) TOPICAL EVERY 6 HOURS
Refills: 0 | Status: DISCONTINUED | OUTPATIENT
Start: 2024-10-23 | End: 2024-10-24

## 2024-10-23 RX ORDER — BENZOCAINE 200 MG/G
1 GEL ORAL EVERY 6 HOURS
Refills: 0 | Status: DISCONTINUED | OUTPATIENT
Start: 2024-10-23 | End: 2024-10-24

## 2024-10-23 RX ORDER — CITRIC ACID/SODIUM CITRATE 334-500MG
15 SOLUTION, ORAL ORAL EVERY 6 HOURS
Refills: 0 | Status: DISCONTINUED | OUTPATIENT
Start: 2024-10-23 | End: 2024-10-23

## 2024-10-23 RX ORDER — PRAMOXINE HCL 1 %
1 CREAM (GRAM) RECTAL EVERY 4 HOURS
Refills: 0 | Status: DISCONTINUED | OUTPATIENT
Start: 2024-10-23 | End: 2024-10-24

## 2024-10-23 RX ORDER — SIMETHICONE 80 MG/1
80 TABLET, CHEWABLE ORAL EVERY 4 HOURS
Refills: 0 | Status: DISCONTINUED | OUTPATIENT
Start: 2024-10-23 | End: 2024-10-24

## 2024-10-23 RX ORDER — OXYTOCIN IN D5W-0.2% SODIUM CL 15/250 ML
167 PLASTIC BAG, INJECTION (ML) INTRAVENOUS
Qty: 30 | Refills: 0 | Status: DISCONTINUED | OUTPATIENT
Start: 2024-10-23 | End: 2024-10-23

## 2024-10-23 RX ORDER — SODIUM CHLORIDE 9 MG/ML
3 INJECTION, SOLUTION INTRAMUSCULAR; INTRAVENOUS; SUBCUTANEOUS EVERY 8 HOURS
Refills: 0 | Status: DISCONTINUED | OUTPATIENT
Start: 2024-10-23 | End: 2024-10-24

## 2024-10-23 RX ORDER — OXYTOCIN IN D5W-0.2% SODIUM CL 15/250 ML
333 PLASTIC BAG, INJECTION (ML) INTRAVENOUS
Qty: 30 | Refills: 0 | Status: DISCONTINUED | OUTPATIENT
Start: 2024-10-23 | End: 2024-10-24

## 2024-10-23 RX ORDER — MAGNESIUM HYDROXIDE 1200 MG/15ML
30 SUSPENSION ORAL
Refills: 0 | Status: DISCONTINUED | OUTPATIENT
Start: 2024-10-23 | End: 2024-10-24

## 2024-10-23 RX ORDER — PRENATAL VIT/IRON FUM/FOLIC AC 60 MG-1 MG
1 TABLET ORAL DAILY
Refills: 0 | Status: DISCONTINUED | OUTPATIENT
Start: 2024-10-23 | End: 2024-10-24

## 2024-10-23 RX ORDER — CHLORHEXIDINE GLUCONATE 40 MG/ML
1 SOLUTION TOPICAL DAILY
Refills: 0 | Status: DISCONTINUED | OUTPATIENT
Start: 2024-10-23 | End: 2024-10-23

## 2024-10-23 RX ORDER — OXYCODONE HYDROCHLORIDE 30 MG/1
5 TABLET ORAL
Refills: 0 | Status: DISCONTINUED | OUTPATIENT
Start: 2024-10-23 | End: 2024-10-24

## 2024-10-23 RX ORDER — ACETAMINOPHEN 500 MG
975 TABLET ORAL EVERY 6 HOURS
Refills: 0 | Status: DISCONTINUED | OUTPATIENT
Start: 2024-10-23 | End: 2024-10-24

## 2024-10-23 RX ORDER — OXYTOCIN IN D5W-0.2% SODIUM CL 15/250 ML
10 PLASTIC BAG, INJECTION (ML) INTRAVENOUS ONCE
Refills: 0 | Status: COMPLETED | OUTPATIENT
Start: 2024-10-23 | End: 2024-10-23

## 2024-10-23 RX ORDER — MODIFIED LANOLIN
1 OINTMENT (GRAM) TOPICAL EVERY 6 HOURS
Refills: 0 | Status: DISCONTINUED | OUTPATIENT
Start: 2024-10-23 | End: 2024-10-24

## 2024-10-23 RX ORDER — ACETAMINOPHEN 500 MG
975 TABLET ORAL EVERY 6 HOURS
Refills: 0 | Status: COMPLETED | OUTPATIENT
Start: 2024-10-23 | End: 2025-09-21

## 2024-10-23 RX ORDER — DIPHENHYDRAMINE HCL 12.5MG/5ML
25 ELIXIR ORAL EVERY 6 HOURS
Refills: 0 | Status: DISCONTINUED | OUTPATIENT
Start: 2024-10-23 | End: 2024-10-24

## 2024-10-23 RX ORDER — IBUPROFEN 200 MG
600 TABLET ORAL EVERY 6 HOURS
Refills: 0 | Status: DISCONTINUED | OUTPATIENT
Start: 2024-10-23 | End: 2024-10-24

## 2024-10-23 RX ORDER — KETOROLAC TROMETHAMINE 30 MG/ML
30 INJECTION INTRAMUSCULAR; INTRAVENOUS ONCE
Refills: 0 | Status: DISCONTINUED | OUTPATIENT
Start: 2024-10-23 | End: 2024-10-23

## 2024-10-23 RX ORDER — INFLUENZ VIR VAC TV P-SURF2003 15MCG/.5ML
0.5 SYRINGE (ML) INTRAMUSCULAR ONCE
Refills: 0 | Status: DISCONTINUED | OUTPATIENT
Start: 2024-10-23 | End: 2024-10-24

## 2024-10-23 RX ORDER — CLOSTRIDIUM TETANI TOXOID ANTIGEN (FORMALDEHYDE INACTIVATED), CORYNEBACTERIUM DIPHTHERIAE TOXOID ANTIGEN (FORMALDEHYDE INACTIVATED), BORDETELLA PERTUSSIS TOXOID ANTIGEN (GLUTARALDEHYDE INACTIVATED), BORDETELLA PERTUSSIS FILAMENTOUS HEMAGGLUTININ ANTIGEN (FORMALDEHYDE INACTIVATED), BORDETELLA PERTUSSIS PERTACTIN ANTIGEN, AND BORDETELLA PERTUSSIS FIMBRIAE 2/3 ANTIGEN 5; 2; 2.5; 5; 3; 5 [LF]/.5ML; [LF]/.5ML; UG/.5ML; UG/.5ML; UG/.5ML; UG/.5ML
0.5 INJECTION, SUSPENSION INTRAMUSCULAR ONCE
Refills: 0 | Status: DISCONTINUED | OUTPATIENT
Start: 2024-10-23 | End: 2024-10-24

## 2024-10-23 RX ADMIN — Medication 975 MILLIGRAM(S): at 21:30

## 2024-10-23 RX ADMIN — KETOROLAC TROMETHAMINE 30 MILLIGRAM(S): 30 INJECTION INTRAMUSCULAR; INTRAVENOUS at 10:11

## 2024-10-23 RX ADMIN — Medication 1 TABLET(S): at 18:09

## 2024-10-23 RX ADMIN — Medication 975 MILLIGRAM(S): at 21:00

## 2024-10-23 RX ADMIN — Medication 333 MILLIUNIT(S)/MIN: at 09:41

## 2024-10-23 RX ADMIN — Medication 10 UNIT(S): at 10:36

## 2024-10-23 RX ADMIN — Medication 125 MILLILITER(S): at 09:38

## 2024-10-23 RX ADMIN — Medication 600 MILLIGRAM(S): at 18:09

## 2024-10-23 NOTE — OB PROVIDER H&P - NSLOWPPHRISK_OBGYN_A_OB
[place] Pregnancy/Less than or equal to 4 previous vaginal births/No known bleeding disorder/No history of postpartum hemorrhage

## 2024-10-23 NOTE — DISCHARGE NOTE OB - CARE PLAN
1 Principal Discharge DX:	, delivered  Assessment and plan of treatment:	Return to baseline health, regular diet, pain control. Follow up with Dr. Jimenez.

## 2024-10-23 NOTE — DISCHARGE NOTE OB - MEDICATION SUMMARY - MEDICATIONS TO TAKE
I will START or STAY ON the medications listed below when I get home from the hospital:    ibuprofen 600 mg oral tablet  -- 1 tab(s) by mouth every 6 hours as needed for cramping  -- Indication: For cramping    acetaminophen 325 mg oral tablet  -- 3 tab(s) by mouth every 6 hours as needed for  mild pain  -- Indication: For mild pain
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
## 2024-10-23 NOTE — PRE-ANESTHESIA EVALUATION ADULT - NSANTHADDINFOFT_GEN_ALL_CORE
r/b/a discussed, all questions answered, OB attending requesting epidural be placed now prior to labs being available

## 2024-10-23 NOTE — OB RN DELIVERY SUMMARY - NS_SEPSISRSKCALC_OBGYN_ALL_OB_FT
EOS calculated successfully. EOS Risk Factor: 0.5/1000 live births ([facility] national incidence); GA=38w4d; Temp=98.4; ROM=1.05; GBS='Positive'; Antibiotics='Broad spectrum antibiotics 2-3.9 hrs prior to birth'

## 2024-10-23 NOTE — DISCHARGE NOTE OB - CARE PROVIDER_API CALL
Nicole Jimenez  Obstetrics and Gynecology  [address]  [place] [address]  Phone: [phone]  Fax: [phone]  Follow Up Time:

## 2024-10-23 NOTE — DISCHARGE NOTE OB - FINANCIAL ASSISTANCE
[facility] provides services at a reduced cost to those who are determined to be eligible through [facility]’s financial assistance program. Information regarding [facility]’s financial assistance program can be found by going to [email or web address] or by calling [phone].

## 2024-10-23 NOTE — OB RN DELIVERY SUMMARY - NSSELHIDDEN_OBGYN_ALL_OB_FT
[[email or web address]_ALL_OB_FT:[email or web address]],[NS_DeliveryRN_OBGYN_ALL_OB_FT:[email or web address]=]

## 2024-10-23 NOTE — OB PROVIDER H&P - HISTORY OF PRESENT ILLNESS
35yo      @  38w 4d   presents c/o contractions, denies LOF or VB has + FM    PNC: Dr Jimenez  PNI: uncomplicated  PNL: GBS positive    All: No Known Allergies  Meds: PNV  PMHx: Denies  PSHx: Denies  Socialhx: Denies x 3, Denies anxiety or depression  OBhx:   FT  c/s  2017  FT    FT   1st tri SAB  1st tri TOP  GYNhx: Denies fibroids, ov cysts or STDs or abnormal pap smears    T(C): --  HR: 100 (10-23-24 @ 08:45) (79 - 113)  BP: 109/54 (10-23-24 @ 08:40) (102/56 - 141/61)  SpO2: 100% (10-23-24 @ 08:45) (98% - 100%)    Gen: NAD  Heart: RRR  Lungs: CTA B/L  Abdomen: Gravid, NT  Ext: no calf tenderness    NST: 130's moderate variability + accels no decels  TOCO: irregular  VE: 6/75/-2  EFW: 3200

## 2024-10-23 NOTE — OB PROVIDER H&P - PROBLEM SELECTOR PLAN 1
- Admit to L & D/labs/IVF/clear  - Fetal status - cat 1  - GBS + --> ampicillin prophylaxis  - Pain control - epidural  - Labor management- expectant management  - R/B/A discussed - pt would like to TOLAC  - Consents to be signed  D/W Dr Tony Land PA-C

## 2024-10-23 NOTE — OB RN DELIVERY SUMMARY - NS_GENERALBABYACOMMENTA_OBGYN_ALL_OB_FT
Patient had to be moved to recovery.  Baby put back skin-to-skin as soon as patient transferred to stretcher.

## 2024-10-23 NOTE — OB RN PATIENT PROFILE - FALL HARM RISK - UNIVERSAL INTERVENTIONS
Bed in lowest position, wheels locked, appropriate side rails in place/Call bell, personal items and telephone in reach/Instruct patient to call for assistance before getting out of bed or chair/Non-slip footwear when patient is out of bed/[place] to call system/Physically safe environment - no spills, clutter or unnecessary equipment/Purposeful Proactive Rounding/Room/bathroom lighting operational, light cord in reach

## 2024-10-24 VITALS
HEART RATE: 73 BPM | OXYGEN SATURATION: 95 % | TEMPERATURE: 98 F | DIASTOLIC BLOOD PRESSURE: 74 MMHG | RESPIRATION RATE: 18 BRPM | SYSTOLIC BLOOD PRESSURE: 122 MMHG

## 2024-10-24 PROCEDURE — 86901 BLOOD TYPING SEROLOGIC RH(D): CPT

## 2024-10-24 PROCEDURE — 36415 COLL VENOUS BLD VENIPUNCTURE: CPT

## 2024-10-24 PROCEDURE — 86850 RBC ANTIBODY SCREEN: CPT

## 2024-10-24 PROCEDURE — 86900 BLOOD TYPING SEROLOGIC ABO: CPT

## 2024-10-24 PROCEDURE — 86780 TREPONEMA PALLIDUM: CPT

## 2024-10-24 PROCEDURE — 85025 COMPLETE CBC W/AUTO DIFF WBC: CPT

## 2024-10-24 PROCEDURE — 59050 FETAL MONITOR W/REPORT: CPT

## 2024-10-24 RX ADMIN — Medication 600 MILLIGRAM(S): at 00:06

## 2024-10-24 RX ADMIN — Medication 975 MILLIGRAM(S): at 09:13

## 2024-10-24 RX ADMIN — Medication 600 MILLIGRAM(S): at 06:15

## 2024-10-24 RX ADMIN — Medication 600 MILLIGRAM(S): at 05:43

## 2024-10-24 RX ADMIN — Medication 600 MILLIGRAM(S): at 00:30

## 2024-10-24 RX ADMIN — Medication 975 MILLIGRAM(S): at 09:45

## 2024-10-24 RX ADMIN — Medication 600 MILLIGRAM(S): at 12:09

## 2024-10-24 RX ADMIN — Medication 1 TABLET(S): at 12:10

## 2024-10-24 RX ADMIN — Medication 600 MILLIGRAM(S): at 12:40

## 2024-10-24 NOTE — PROGRESS NOTE ADULT - ASSESSMENT
33y/o  PPD#1 from VAVD in stable condition. Pt currently meeting postpartum milestones.     #Postpartum  - Continue with PO analgesia  - Increase ambulation  - Continue regular diet  - IV lock  - No labs    Dianna Rizo PGY1

## 2024-10-24 NOTE — PROGRESS NOTE ADULT - SUBJECTIVE AND OBJECTIVE BOX
R1 Progress Note    Patient seen and examined at bedside, no acute overnight events. No acute complaints, pain well controlled. Patient is ambulating, voiding spontaneously, passing gas, and tolerating regular diet. Denies CP, SOB, N/V, HA, or blurred vision.     Vital Signs Last 24 Hours  T(C): 36.4 (10-23-24 @ 22:10), Max: 36.9 (10-23-24 @ 09:58)  HR: 78 (10-23-24 @ 22:10) (78 - 126)  BP: 100/68 (10-23-24 @ 22:10) (90/49 - 141/61)  RR: 18 (10-23-24 @ 22:10) (16 - 18)  SpO2: 98% (10-23-24 @ 22:10) (89% - 100%)    Physical Exam:  General: NAD  Pulm: breathing comfortably on room air   Abdomen: Soft, appropriately-tender, mildly-distended, fundus firm  Pelvic: Lochia wnl    Labs:    Blood Type: A Positive  Antibody Screen: Negative  RPR: Negative               11.1   10.66 )-----------( 169      ( 10-23 @ 08:16 )             33.7         MEDICATIONS  (STANDING):  acetaminophen     Tablet .. 975 milliGRAM(s) Oral every 6 hours  acetaminophen   IVPB .. 1000 milliGRAM(s) IV Intermittent once  diphtheria/tetanus/pertussis (acellular) Vaccine (Adacel) 0.5 milliLiter(s) IntraMuscular once  ibuprofen  Tablet. 600 milliGRAM(s) Oral every 6 hours  influenza   Vaccine 0.5 milliLiter(s) IntraMuscular once  oxytocin Infusion 167 milliUNIT(s)/Min (167 mL/Hr) IV Continuous <Continuous>  oxytocin Infusion 333 milliUNIT(s)/Min (333 mL/Hr) IV Continuous <Continuous>  prenatal multivitamin 1 Tablet(s) Oral daily  sodium chloride 0.9% lock flush 3 milliLiter(s) IV Push every 8 hours    MEDICATIONS  (PRN):  benzocaine 20%/menthol 0.5% Spray 1 Spray(s) Topical every 6 hours PRN for Perineal discomfort  dibucaine 1% Ointment 1 Application(s) Topical every 6 hours PRN Perineal discomfort  diphenhydrAMINE 25 milliGRAM(s) Oral every 6 hours PRN Pruritus  hydrocortisone 1% Cream 1 Application(s) Topical every 6 hours PRN Moderate Pain (4-6)  lanolin Ointment 1 Application(s) Topical every 6 hours PRN nipple soreness  magnesium hydroxide Suspension 30 milliLiter(s) Oral two times a day PRN Constipation  oxyCODONE    IR 5 milliGRAM(s) Oral once PRN Moderate to Severe Pain (4-10)  oxyCODONE    IR 5 milliGRAM(s) Oral every 3 hours PRN Moderate to Severe Pain (4-10)  pramoxine 1%/zinc 5% Cream 1 Application(s) Topical every 4 hours PRN Moderate Pain (4-6)  simethicone 80 milliGRAM(s) Chew every 4 hours PRN Gas  witch hazel Pads 1 Application(s) Topical every 4 hours PRN Perineal discomfort